# Patient Record
Sex: MALE | Race: WHITE | NOT HISPANIC OR LATINO
[De-identification: names, ages, dates, MRNs, and addresses within clinical notes are randomized per-mention and may not be internally consistent; named-entity substitution may affect disease eponyms.]

---

## 2017-01-19 ENCOUNTER — NON-APPOINTMENT (OUTPATIENT)
Age: 32
End: 2017-01-19

## 2017-01-19 ENCOUNTER — APPOINTMENT (OUTPATIENT)
Dept: INTERNAL MEDICINE | Facility: CLINIC | Age: 32
End: 2017-01-19

## 2017-01-19 VITALS
OXYGEN SATURATION: 99 % | WEIGHT: 136 LBS | BODY MASS INDEX: 21.35 KG/M2 | TEMPERATURE: 97.3 F | SYSTOLIC BLOOD PRESSURE: 110 MMHG | HEART RATE: 68 BPM | HEIGHT: 67 IN | DIASTOLIC BLOOD PRESSURE: 70 MMHG

## 2017-01-20 LAB
ALBUMIN SERPL ELPH-MCNC: 5 G/DL
ALP BLD-CCNC: 37 U/L
ALT SERPL-CCNC: 45 U/L
ANION GAP SERPL CALC-SCNC: 14 MMOL/L
APPEARANCE: CLEAR
AST SERPL-CCNC: 24 U/L
BASOPHILS # BLD AUTO: 0.03 K/UL
BASOPHILS NFR BLD AUTO: 0.6 %
BILIRUB SERPL-MCNC: 0.4 MG/DL
BILIRUBIN URINE: NEGATIVE
BLOOD URINE: NEGATIVE
BUN SERPL-MCNC: 17 MG/DL
CALCIUM SERPL-MCNC: 9.9 MG/DL
CHLORIDE SERPL-SCNC: 100 MMOL/L
CHOLEST SERPL-MCNC: 215 MG/DL
CHOLEST/HDLC SERPL: 3.3 RATIO
CO2 SERPL-SCNC: 27 MMOL/L
COLOR: YELLOW
CREAT SERPL-MCNC: 0.84 MG/DL
EOSINOPHIL # BLD AUTO: 0.28 K/UL
EOSINOPHIL NFR BLD AUTO: 6 %
GLUCOSE QUALITATIVE U: NORMAL MG/DL
GLUCOSE SERPL-MCNC: 87 MG/DL
HCT VFR BLD CALC: 40.1 %
HDLC SERPL-MCNC: 66 MG/DL
HGB BLD-MCNC: 13.6 G/DL
IMM GRANULOCYTES NFR BLD AUTO: 0 %
KETONES URINE: NEGATIVE
LDLC SERPL CALC-MCNC: 129 MG/DL
LEUKOCYTE ESTERASE URINE: NEGATIVE
LYMPHOCYTES # BLD AUTO: 1.81 K/UL
LYMPHOCYTES NFR BLD AUTO: 39 %
MAN DIFF?: NORMAL
MCHC RBC-ENTMCNC: 29.3 PG
MCHC RBC-ENTMCNC: 33.9 GM/DL
MCV RBC AUTO: 86.4 FL
MONOCYTES # BLD AUTO: 0.3 K/UL
MONOCYTES NFR BLD AUTO: 6.5 %
NEUTROPHILS # BLD AUTO: 2.22 K/UL
NEUTROPHILS NFR BLD AUTO: 47.9 %
NITRITE URINE: NEGATIVE
PH URINE: 6.5
PLATELET # BLD AUTO: 218 K/UL
POTASSIUM SERPL-SCNC: 4.5 MMOL/L
PROT SERPL-MCNC: 7.6 G/DL
PROTEIN URINE: NEGATIVE MG/DL
RBC # BLD: 4.64 M/UL
RBC # FLD: 12.7 %
SODIUM SERPL-SCNC: 141 MMOL/L
SPECIFIC GRAVITY URINE: 1.02
TRIGL SERPL-MCNC: 98 MG/DL
UROBILINOGEN URINE: NORMAL MG/DL
WBC # FLD AUTO: 4.64 K/UL

## 2017-01-23 LAB — HBA1C MFR BLD HPLC: 5.2 %

## 2017-01-30 ENCOUNTER — FORM ENCOUNTER (OUTPATIENT)
Age: 32
End: 2017-01-30

## 2017-01-30 ENCOUNTER — APPOINTMENT (OUTPATIENT)
Dept: SURGERY | Facility: CLINIC | Age: 32
End: 2017-01-30

## 2017-01-30 VITALS
WEIGHT: 137 LBS | DIASTOLIC BLOOD PRESSURE: 57 MMHG | SYSTOLIC BLOOD PRESSURE: 97 MMHG | OXYGEN SATURATION: 99 % | TEMPERATURE: 97.4 F | HEART RATE: 68 BPM | BODY MASS INDEX: 21.76 KG/M2 | HEIGHT: 66.5 IN

## 2017-01-30 DIAGNOSIS — N50.89 OTHER SPECIFIED DISORDERS OF THE MALE GENITAL ORGANS: ICD-10-CM

## 2017-01-30 DIAGNOSIS — R07.89 OTHER CHEST PAIN: ICD-10-CM

## 2017-01-30 DIAGNOSIS — R10.30 LOWER ABDOMINAL PAIN, UNSPECIFIED: ICD-10-CM

## 2017-01-30 DIAGNOSIS — Z82.49 FAMILY HISTORY OF ISCHEMIC HEART DISEASE AND OTHER DISEASES OF THE CIRCULATORY SYSTEM: ICD-10-CM

## 2017-01-30 DIAGNOSIS — F15.90 OTHER STIMULANT USE, UNSPECIFIED, UNCOMPLICATED: ICD-10-CM

## 2017-01-30 DIAGNOSIS — Z87.820 PERSONAL HISTORY OF TRAUMATIC BRAIN INJURY: ICD-10-CM

## 2017-01-30 DIAGNOSIS — Z86.19 PERSONAL HISTORY OF OTHER INFECTIOUS AND PARASITIC DISEASES: ICD-10-CM

## 2017-01-31 ENCOUNTER — OUTPATIENT (OUTPATIENT)
Dept: OUTPATIENT SERVICES | Facility: HOSPITAL | Age: 32
LOS: 1 days | End: 2017-01-31
Payer: COMMERCIAL

## 2017-01-31 PROCEDURE — 76870 US EXAM SCROTUM: CPT | Mod: 26

## 2017-01-31 PROCEDURE — 76705 ECHO EXAM OF ABDOMEN: CPT

## 2017-01-31 PROCEDURE — 76870 US EXAM SCROTUM: CPT

## 2017-01-31 PROCEDURE — 76705 ECHO EXAM OF ABDOMEN: CPT | Mod: 26,59

## 2017-01-31 PROCEDURE — 93976 VASCULAR STUDY: CPT | Mod: 26

## 2017-02-03 ENCOUNTER — APPOINTMENT (OUTPATIENT)
Dept: UROLOGY | Facility: CLINIC | Age: 32
End: 2017-02-03

## 2017-02-03 VITALS
TEMPERATURE: 98.3 F | HEIGHT: 66.5 IN | BODY MASS INDEX: 21.76 KG/M2 | HEART RATE: 71 BPM | DIASTOLIC BLOOD PRESSURE: 80 MMHG | WEIGHT: 137 LBS | SYSTOLIC BLOOD PRESSURE: 135 MMHG

## 2017-02-05 ENCOUNTER — FORM ENCOUNTER (OUTPATIENT)
Age: 32
End: 2017-02-05

## 2017-02-06 ENCOUNTER — OUTPATIENT (OUTPATIENT)
Dept: OUTPATIENT SERVICES | Facility: HOSPITAL | Age: 32
LOS: 1 days | End: 2017-02-06
Payer: COMMERCIAL

## 2017-02-06 PROCEDURE — 93976 VASCULAR STUDY: CPT | Mod: 26

## 2017-02-06 PROCEDURE — 76870 US EXAM SCROTUM: CPT

## 2017-02-06 PROCEDURE — 76870 US EXAM SCROTUM: CPT | Mod: 26

## 2017-02-07 LAB
AFP-TM SERPL-MCNC: 2.6 NG/ML
HCG SERPL-MCNC: <1 MIU/ML
LDH SERPL-CCNC: 160 U/L

## 2017-02-10 ENCOUNTER — APPOINTMENT (OUTPATIENT)
Dept: UROLOGY | Facility: CLINIC | Age: 32
End: 2017-02-10

## 2017-02-20 ENCOUNTER — FORM ENCOUNTER (OUTPATIENT)
Age: 32
End: 2017-02-20

## 2017-02-21 ENCOUNTER — OUTPATIENT (OUTPATIENT)
Dept: OUTPATIENT SERVICES | Facility: HOSPITAL | Age: 32
LOS: 1 days | End: 2017-02-21
Payer: COMMERCIAL

## 2017-02-21 PROCEDURE — 76870 US EXAM SCROTUM: CPT | Mod: 26

## 2017-02-21 PROCEDURE — 76870 US EXAM SCROTUM: CPT

## 2017-02-24 ENCOUNTER — APPOINTMENT (OUTPATIENT)
Dept: UROLOGY | Facility: CLINIC | Age: 32
End: 2017-02-24

## 2017-02-24 VITALS
WEIGHT: 137 LBS | OXYGEN SATURATION: 98 % | DIASTOLIC BLOOD PRESSURE: 79 MMHG | SYSTOLIC BLOOD PRESSURE: 130 MMHG | TEMPERATURE: 98.3 F | HEIGHT: 66.5 IN | BODY MASS INDEX: 21.76 KG/M2 | HEART RATE: 95 BPM

## 2017-04-23 ENCOUNTER — FORM ENCOUNTER (OUTPATIENT)
Age: 32
End: 2017-04-23

## 2017-04-24 ENCOUNTER — OUTPATIENT (OUTPATIENT)
Dept: OUTPATIENT SERVICES | Facility: HOSPITAL | Age: 32
LOS: 1 days | End: 2017-04-24
Payer: COMMERCIAL

## 2017-04-24 PROCEDURE — 76870 US EXAM SCROTUM: CPT

## 2017-04-24 PROCEDURE — 76870 US EXAM SCROTUM: CPT | Mod: 26

## 2017-04-28 ENCOUNTER — APPOINTMENT (OUTPATIENT)
Dept: UROLOGY | Facility: CLINIC | Age: 32
End: 2017-04-28

## 2017-04-28 VITALS
DIASTOLIC BLOOD PRESSURE: 73 MMHG | HEART RATE: 55 BPM | HEIGHT: 66 IN | WEIGHT: 137 LBS | SYSTOLIC BLOOD PRESSURE: 115 MMHG | BODY MASS INDEX: 22.02 KG/M2 | TEMPERATURE: 97.6 F

## 2017-11-17 ENCOUNTER — APPOINTMENT (OUTPATIENT)
Dept: UROLOGY | Facility: CLINIC | Age: 32
End: 2017-11-17
Payer: COMMERCIAL

## 2017-11-17 VITALS
WEIGHT: 137 LBS | BODY MASS INDEX: 22.02 KG/M2 | SYSTOLIC BLOOD PRESSURE: 118 MMHG | DIASTOLIC BLOOD PRESSURE: 73 MMHG | TEMPERATURE: 98.4 F | HEIGHT: 66 IN | HEART RATE: 74 BPM

## 2017-11-17 PROCEDURE — 99213 OFFICE O/P EST LOW 20 MIN: CPT

## 2017-12-12 ENCOUNTER — FORM ENCOUNTER (OUTPATIENT)
Age: 32
End: 2017-12-12

## 2017-12-13 ENCOUNTER — OUTPATIENT (OUTPATIENT)
Dept: OUTPATIENT SERVICES | Facility: HOSPITAL | Age: 32
LOS: 1 days | End: 2017-12-13
Payer: COMMERCIAL

## 2017-12-13 PROCEDURE — 76870 US EXAM SCROTUM: CPT | Mod: 26

## 2017-12-13 PROCEDURE — 76870 US EXAM SCROTUM: CPT

## 2018-05-22 ENCOUNTER — APPOINTMENT (OUTPATIENT)
Dept: INTERNAL MEDICINE | Facility: CLINIC | Age: 33
End: 2018-05-22
Payer: COMMERCIAL

## 2018-05-22 VITALS
OXYGEN SATURATION: 98 % | WEIGHT: 141 LBS | TEMPERATURE: 97.5 F | SYSTOLIC BLOOD PRESSURE: 104 MMHG | HEART RATE: 75 BPM | HEIGHT: 66.5 IN | DIASTOLIC BLOOD PRESSURE: 68 MMHG | BODY MASS INDEX: 22.39 KG/M2

## 2018-05-22 DIAGNOSIS — Z13.89 ENCOUNTER FOR SCREENING FOR OTHER DISORDER: ICD-10-CM

## 2018-05-22 DIAGNOSIS — Z00.00 ENCOUNTER FOR GENERAL ADULT MEDICAL EXAMINATION W/OUT ABNORMAL FINDINGS: ICD-10-CM

## 2018-05-22 PROCEDURE — 99395 PREV VISIT EST AGE 18-39: CPT | Mod: 25

## 2018-05-22 PROCEDURE — 36415 COLL VENOUS BLD VENIPUNCTURE: CPT

## 2018-05-22 PROCEDURE — 99213 OFFICE O/P EST LOW 20 MIN: CPT | Mod: 25

## 2018-05-22 PROCEDURE — G0444 DEPRESSION SCREEN ANNUAL: CPT

## 2018-05-22 NOTE — COUNSELING
[Healthy eating counseling provided] : healthy eating [Activity counseling provided] : activity [Good understanding] : Patient has a good understanding of lifestyle changes and the steps needed to achieve self management goals [ - Annual Depression Screening] : Annual Depression Screening

## 2018-05-22 NOTE — HEALTH RISK ASSESSMENT
[Good] : ~his/her~  mood as  good [No falls in past year] : Patient reported no falls in the past year [Employed] : employed [Single] : single [Fully functional (bathing, dressing, toileting, transferring, walking, feeding)] : Fully functional (bathing, dressing, toileting, transferring, walking, feeding) [Fully functional (using the telephone, shopping, preparing meals, housekeeping, doing laundry, using] : Fully functional and needs no help or supervision to perform IADLs (using the telephone, shopping, preparing meals, housekeeping, doing laundry, using transportation, managing medications and managing finances) [Smoke Detector] : smoke detector [Seat Belt] :  uses seat belt [Sunscreen] : uses sunscreen [Patient declined discussion] : Patient declined discussion [] : No [Change in mental status noted] : No change in mental status noted [Sexually Active] : not sexually active [Reports changes in hearing] : Reports no changes in hearing [Reports changes in vision] : Reports no changes in vision [Reports changes in dental health] : Reports no changes in dental health [TB Exposure] : is not being exposed to tuberculosis [de-identified] : roomate

## 2018-05-22 NOTE — ASSESSMENT
[FreeTextEntry1] : Health maintenance.\par BMI is good and no weight loss is currently recommended.\par Aerobic exercise on a daily basis strongly recommended.\par Minimal STD risk and no substance abuse per patient report. Safe sex behavior reviewed.\par HIV antibody sent with patient approval.\par Occasional gender specific self-examination suggested.\par No depression. Competent with ADLs.\par Yearly flu vaccine recommended.\par \par Persistent left lower quadrant abdominal pain.\par Based on patient's history and today's examination, it is difficult to determine whether this is related to musculoskeletal issue or underlying intra-abdominal issue. Inguinal hernia previously ruled out based on exam and prior ultrasounds.Given association between pain exacerbation and exertion as well as positional component (worsens after working out or crouching or lifting heavy object) seems most likely to be of muscular etiology. However given persistence over more than 2 years, must rule out GI etiology. This was discussed with patient and questions answered to the best ability. Referred to GI for consideration of further workup including possible CT scan and or colonoscopy.Contact information provided and patient will make appointment soon.\par \par Small hypoechoic mass upper right testicular pole.\par This has been carefully followed up over the past year by urology and at this point there is no concern for malignancy. Patient urged to continue q. 6 monthly followup and states he will do so which means he will make an appointment within the next few weeks. It should probably be followed by ultrasound on a 4-6 monthly basis over the next year or so as determined by urology. Beta hCG blood testing sent today.

## 2018-05-22 NOTE — HISTORY OF PRESENT ILLNESS
[FreeTextEntry1] : Now 32 years old.\par In past year, no hospitalizations, no surgery, no new medical diagnoses. [de-identified] : His history of extended evaluation for ? Right testicular mass over the past year. Multiple serial scrotal ultrasounds were negative for progression and beta-hCG blood work was negative. No inguinal hernia was present.\par He continues to complain of intermittent but persistent left lower quadrant abdominal pain. This has been present now for over 2 years. Has not been progressive but neither has it improved. He denies any associated GI symptoms such as nausea, vomiting, diarrhea, or constipation. States the pain feels more like a muscle tear than it does like intestinal cramping.

## 2018-05-23 LAB
ALBUMIN SERPL ELPH-MCNC: 4.4 G/DL
ALP BLD-CCNC: 34 U/L
ALT SERPL-CCNC: 17 U/L
ANION GAP SERPL CALC-SCNC: 15 MMOL/L
APPEARANCE: CLEAR
AST SERPL-CCNC: 20 U/L
BASOPHILS # BLD AUTO: 0.03 K/UL
BASOPHILS NFR BLD AUTO: 0.7 %
BILIRUB SERPL-MCNC: 0.6 MG/DL
BILIRUBIN URINE: NEGATIVE
BLOOD URINE: NEGATIVE
BUN SERPL-MCNC: 16 MG/DL
CALCIUM SERPL-MCNC: 9.8 MG/DL
CHLORIDE SERPL-SCNC: 101 MMOL/L
CHOLEST SERPL-MCNC: 205 MG/DL
CHOLEST/HDLC SERPL: 3.2 RATIO
CO2 SERPL-SCNC: 25 MMOL/L
COLOR: YELLOW
CREAT SERPL-MCNC: 0.98 MG/DL
EOSINOPHIL # BLD AUTO: 0.33 K/UL
EOSINOPHIL NFR BLD AUTO: 7.3 %
GLUCOSE QUALITATIVE U: NEGATIVE MG/DL
GLUCOSE SERPL-MCNC: 78 MG/DL
HBA1C MFR BLD HPLC: 5.2 %
HCG SERPL-MCNC: <1 MIU/ML
HCT VFR BLD CALC: 43.4 %
HDLC SERPL-MCNC: 65 MG/DL
HGB BLD-MCNC: 14.8 G/DL
HIV1+2 AB SPEC QL IA.RAPID: NONREACTIVE
IMM GRANULOCYTES NFR BLD AUTO: 0 %
KETONES URINE: NEGATIVE
LDLC SERPL CALC-MCNC: 117 MG/DL
LEUKOCYTE ESTERASE URINE: NEGATIVE
LYMPHOCYTES # BLD AUTO: 1.77 K/UL
LYMPHOCYTES NFR BLD AUTO: 39.4 %
MAN DIFF?: NORMAL
MCHC RBC-ENTMCNC: 29.8 PG
MCHC RBC-ENTMCNC: 34.1 GM/DL
MCV RBC AUTO: 87.3 FL
MONOCYTES # BLD AUTO: 0.44 K/UL
MONOCYTES NFR BLD AUTO: 9.8 %
NEUTROPHILS # BLD AUTO: 1.92 K/UL
NEUTROPHILS NFR BLD AUTO: 42.8 %
NITRITE URINE: NEGATIVE
PH URINE: 7
PLATELET # BLD AUTO: 201 K/UL
POTASSIUM SERPL-SCNC: 4.9 MMOL/L
PROT SERPL-MCNC: 7.5 G/DL
PROTEIN URINE: NEGATIVE MG/DL
RBC # BLD: 4.97 M/UL
RBC # FLD: 13.1 %
SODIUM SERPL-SCNC: 141 MMOL/L
SPECIFIC GRAVITY URINE: 1.02
TRIGL SERPL-MCNC: 116 MG/DL
UROBILINOGEN URINE: NEGATIVE MG/DL
WBC # FLD AUTO: 4.49 K/UL

## 2018-06-12 ENCOUNTER — APPOINTMENT (OUTPATIENT)
Dept: MRI IMAGING | Facility: HOSPITAL | Age: 33
End: 2018-06-12
Payer: SELF-PAY

## 2018-06-12 ENCOUNTER — OUTPATIENT (OUTPATIENT)
Dept: OUTPATIENT SERVICES | Facility: HOSPITAL | Age: 33
LOS: 1 days | End: 2018-06-12
Payer: COMMERCIAL

## 2018-06-12 PROCEDURE — A9577: CPT

## 2018-06-12 PROCEDURE — 72196 MRI PELVIS W/DYE: CPT | Mod: 26

## 2018-06-12 PROCEDURE — 74182 MRI ABDOMEN W/CONTRAST: CPT | Mod: 26

## 2018-06-12 PROCEDURE — 74182 MRI ABDOMEN W/CONTRAST: CPT

## 2018-06-12 PROCEDURE — 72196 MRI PELVIS W/DYE: CPT

## 2018-07-05 ENCOUNTER — APPOINTMENT (OUTPATIENT)
Dept: INTERNAL MEDICINE | Facility: CLINIC | Age: 33
End: 2018-07-05
Payer: SELF-PAY

## 2018-07-05 VITALS
TEMPERATURE: 98 F | HEART RATE: 76 BPM | SYSTOLIC BLOOD PRESSURE: 110 MMHG | OXYGEN SATURATION: 98 % | DIASTOLIC BLOOD PRESSURE: 70 MMHG

## 2018-07-05 DIAGNOSIS — R10.32 LEFT LOWER QUADRANT PAIN: ICD-10-CM

## 2018-07-05 DIAGNOSIS — N50.9 DISORDER OF MALE GENITAL ORGANS, UNSPECIFIED: ICD-10-CM

## 2018-07-05 DIAGNOSIS — G89.29 LEFT LOWER QUADRANT PAIN: ICD-10-CM

## 2018-07-05 PROCEDURE — 99214 OFFICE O/P EST MOD 30 MIN: CPT

## 2018-07-05 RX ORDER — METOCLOPRAMIDE 5 MG/1
5 TABLET ORAL
Qty: 30 | Refills: 0 | Status: ACTIVE | COMMUNITY
Start: 2018-02-13

## 2018-07-05 RX ORDER — NAPROXEN 375 MG/1
375 TABLET, DELAYED RELEASE ORAL
Qty: 30 | Refills: 0 | Status: ACTIVE | COMMUNITY
Start: 2018-02-13

## 2018-07-05 RX ORDER — ESCITALOPRAM OXALATE 5 MG/1
5 TABLET ORAL
Qty: 30 | Refills: 0 | Status: ACTIVE | COMMUNITY
Start: 2018-04-11

## 2018-07-05 NOTE — HISTORY OF PRESENT ILLNESS
[FreeTextEntry1] : Wishes to discuss ongoing left lower quadrant abdominal pain. [de-identified] : Patient has completed evaluation by gastroenterology including abdominal/pelvic MRI with enterography and hernia protocol. 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 Colonoscopy is being considered but is not found to be necessary at this time as overwhelmingly the pain is felt to be of a musculoskeletal nature.\par Patient states he does continue to experience this highly localized lower left abdominal wall pain. He notes that the onset occurred suddenly about 2 years ago after lifting a heavy object and is not associated with any significant alteration in gastrointestinal function. He specifically denies significant constipation, diarrhea, nausea, vomiting, blood per rectum, or cramping pain.

## 2018-07-05 NOTE — ASSESSMENT
[FreeTextEntry1] : We had an extended conversation lasting at least 20 minutes regarding his persistent abdominal wall pain and multiple questions were answered to the best of my ability.\par Evaluation so far including GI consultation, surgical consultation, and MRI imaging has ruled out the presence of both intra-abdominal pathology as well as surgical abdominal wall process such as hernia. Given the nature of his pain, it's highly localized distribution, and the absence of any functional gastroenterological symptoms, a specifically colonic etiology seems extremely unlikely. Patient is in agreement with this assessment and states that he has always felt the pain had a musculoskeletal nature.\par Ongoing treatment with NSAIDs is recommended including application of diclofenac gel. Trial of icing with or without heat is also recommended. In addition, patient is referred for pain management evaluation and specific contact information is provided for Dr. Case. States she will make an appointment within the next month.\par Will return in about 3 months for reevaluation.\par \par I have also insisted that the patient followup testicular ultrasound as the most recent ultrasound done about 6 months ago was suggestive of an incremental enlargement of left testicular lesion. Study has been ordered and patient states he will make an appointment within the next few weeks. Further recommendations pending results.

## 2021-12-10 ENCOUNTER — TRANSCRIPTION ENCOUNTER (OUTPATIENT)
Age: 36
End: 2021-12-10

## 2024-09-08 NOTE — PHYSICAL EXAM

## 2024-11-13 ENCOUNTER — APPOINTMENT (OUTPATIENT)
Dept: ORTHOPEDIC SURGERY | Facility: CLINIC | Age: 39
End: 2024-11-13

## 2024-11-13 VITALS — HEIGHT: 66.5 IN | DIASTOLIC BLOOD PRESSURE: 77 MMHG | SYSTOLIC BLOOD PRESSURE: 117 MMHG | HEART RATE: 74 BPM

## 2024-11-13 DIAGNOSIS — M75.42 IMPINGEMENT SYNDROME OF LEFT SHOULDER: ICD-10-CM

## 2024-11-13 PROCEDURE — 73030 X-RAY EXAM OF SHOULDER: CPT | Mod: LT

## 2024-11-13 PROCEDURE — 20610 DRAIN/INJ JOINT/BURSA W/O US: CPT | Mod: LT

## 2024-11-13 PROCEDURE — 99203 OFFICE O/P NEW LOW 30 MIN: CPT | Mod: 25

## 2024-11-14 PROBLEM — M75.42 IMPINGEMENT SYNDROME OF LEFT SHOULDER: Status: ACTIVE | Noted: 2024-11-13

## 2024-11-27 ENCOUNTER — APPOINTMENT (OUTPATIENT)
Dept: ORTHOPEDIC SURGERY | Facility: CLINIC | Age: 39
End: 2024-11-27

## 2024-11-27 DIAGNOSIS — M75.42 IMPINGEMENT SYNDROME OF LEFT SHOULDER: ICD-10-CM

## 2024-11-27 PROCEDURE — 99213 OFFICE O/P EST LOW 20 MIN: CPT

## 2024-12-03 VITALS — HEIGHT: 66.5 IN | WEIGHT: 141 LBS | BODY MASS INDEX: 22.39 KG/M2
